# Patient Record
Sex: FEMALE | Race: BLACK OR AFRICAN AMERICAN | URBAN - METROPOLITAN AREA
[De-identification: names, ages, dates, MRNs, and addresses within clinical notes are randomized per-mention and may not be internally consistent; named-entity substitution may affect disease eponyms.]

---

## 2018-09-18 ENCOUNTER — HOSPITAL ENCOUNTER (OUTPATIENT)
Dept: OTHER | Facility: HOSPITAL | Age: 58
Setting detail: SPECIMEN
Discharge: HOME | End: 2018-09-18
Attending: INTERNAL MEDICINE | Admitting: INTERNAL MEDICINE

## 2020-02-07 ENCOUNTER — OFFICE (OUTPATIENT)
Dept: URBAN - METROPOLITAN AREA CLINIC 75 | Facility: CLINIC | Age: 60
End: 2020-02-07
Payer: COMMERCIAL

## 2020-02-07 VITALS
WEIGHT: 211 LBS | DIASTOLIC BLOOD PRESSURE: 90 MMHG | HEIGHT: 65 IN | HEART RATE: 82 BPM | SYSTOLIC BLOOD PRESSURE: 150 MMHG

## 2020-02-07 DIAGNOSIS — K74.69 OTHER CIRRHOSIS OF LIVER: ICD-10-CM

## 2020-02-07 DIAGNOSIS — R74.8 ABNORMAL LEVELS OF OTHER SERUM ENZYMES: ICD-10-CM

## 2020-02-07 DIAGNOSIS — K75.4 AUTOIMMUNE HEPATITIS: ICD-10-CM

## 2020-02-07 DIAGNOSIS — K21.9 GASTRO-ESOPHAGEAL REFLUX DISEASE WITHOUT ESOPHAGITIS: ICD-10-CM

## 2020-02-07 PROCEDURE — 99203 OFFICE O/P NEW LOW 30 MIN: CPT | Performed by: INTERNAL MEDICINE

## 2020-02-07 RX ORDER — OMEPRAZOLE 40 MG/1
40 CAPSULE, DELAYED RELEASE ORAL
Qty: 90 | Refills: 3 | Status: ACTIVE
Start: 2020-02-07

## 2020-02-13 VITALS
OXYGEN SATURATION: 95 % | SYSTOLIC BLOOD PRESSURE: 170 MMHG | RESPIRATION RATE: 25 BRPM | HEIGHT: 65 IN | RESPIRATION RATE: 13 BRPM | RESPIRATION RATE: 16 BRPM | TEMPERATURE: 97.7 F | OXYGEN SATURATION: 93 % | DIASTOLIC BLOOD PRESSURE: 77 MMHG | SYSTOLIC BLOOD PRESSURE: 122 MMHG | RESPIRATION RATE: 14 BRPM | SYSTOLIC BLOOD PRESSURE: 160 MMHG | DIASTOLIC BLOOD PRESSURE: 90 MMHG | DIASTOLIC BLOOD PRESSURE: 68 MMHG | SYSTOLIC BLOOD PRESSURE: 144 MMHG | DIASTOLIC BLOOD PRESSURE: 98 MMHG | HEART RATE: 93 BPM | SYSTOLIC BLOOD PRESSURE: 118 MMHG | HEART RATE: 86 BPM | OXYGEN SATURATION: 97 % | SYSTOLIC BLOOD PRESSURE: 171 MMHG | HEART RATE: 89 BPM | TEMPERATURE: 97.5 F | HEART RATE: 88 BPM | WEIGHT: 211 LBS | DIASTOLIC BLOOD PRESSURE: 94 MMHG | OXYGEN SATURATION: 96 % | OXYGEN SATURATION: 100 % | HEART RATE: 95 BPM

## 2020-02-14 ENCOUNTER — OFFICE (OUTPATIENT)
Dept: URBAN - METROPOLITAN AREA PATHOLOGY 4 | Facility: PATHOLOGY | Age: 60
End: 2020-02-14
Payer: COMMERCIAL

## 2020-02-14 ENCOUNTER — AMBULATORY SURGICAL CENTER (OUTPATIENT)
Dept: URBAN - METROPOLITAN AREA SURGERY 17 | Facility: SURGERY | Age: 60
End: 2020-02-14
Payer: COMMERCIAL

## 2020-02-14 DIAGNOSIS — K29.80 DUODENITIS WITHOUT BLEEDING: ICD-10-CM

## 2020-02-14 DIAGNOSIS — K21.0 GASTRO-ESOPHAGEAL REFLUX DISEASE WITH ESOPHAGITIS: ICD-10-CM

## 2020-02-14 DIAGNOSIS — I85.00 ESOPHAGEAL VARICES WITHOUT BLEEDING: ICD-10-CM

## 2020-02-14 DIAGNOSIS — K31.89 OTHER DISEASES OF STOMACH AND DUODENUM: ICD-10-CM

## 2020-02-14 DIAGNOSIS — K74.69 OTHER CIRRHOSIS OF LIVER: ICD-10-CM

## 2020-02-14 DIAGNOSIS — K29.70 GASTRITIS, UNSPECIFIED, WITHOUT BLEEDING: ICD-10-CM

## 2020-02-14 PROBLEM — K20.9 ESOPHAGITIS, UNSPECIFIED: Status: ACTIVE | Noted: 2020-02-14

## 2020-02-14 LAB
GI HISTOLOGY: A. SELECT: (no result)
GI HISTOLOGY: PDF REPORT: (no result)

## 2020-02-14 PROCEDURE — 43239 EGD BIOPSY SINGLE/MULTIPLE: CPT | Performed by: INTERNAL MEDICINE

## 2020-02-14 PROCEDURE — 88305 TISSUE EXAM BY PATHOLOGIST: CPT | Performed by: INTERNAL MEDICINE

## 2020-02-14 RX ORDER — PROPRANOLOL HYDROCHLORIDE 20 MG/1
TABLET ORAL
Qty: 0 | Refills: 0 | Status: ACTIVE

## 2020-04-30 VITALS — HEIGHT: 65 IN | WEIGHT: 213 LBS

## 2020-05-01 ENCOUNTER — OFFICE (OUTPATIENT)
Dept: URBAN - METROPOLITAN AREA CLINIC 75 | Facility: CLINIC | Age: 60
End: 2020-05-01
Payer: COMMERCIAL

## 2020-05-01 DIAGNOSIS — K75.4 AUTOIMMUNE HEPATITIS: ICD-10-CM

## 2020-05-01 DIAGNOSIS — K74.69 OTHER CIRRHOSIS OF LIVER: ICD-10-CM

## 2020-05-01 DIAGNOSIS — K25.9 GASTRIC ULCER, UNSPECIFIED AS ACUTE OR CHRONIC, WITHOUT HEMO: ICD-10-CM

## 2020-05-01 DIAGNOSIS — K21.9 GASTRO-ESOPHAGEAL REFLUX DISEASE WITHOUT ESOPHAGITIS: ICD-10-CM

## 2020-05-01 DIAGNOSIS — R77.2 ABNORMALITY OF ALPHAFETOPROTEIN: ICD-10-CM

## 2020-05-01 PROCEDURE — 99213 OFFICE O/P EST LOW 20 MIN: CPT | Mod: 95 | Performed by: INTERNAL MEDICINE

## 2020-05-01 RX ORDER — AZATHIOPRINE 50 MG/1
25 TABLET ORAL
Qty: 45 | Refills: 3 | Status: ACTIVE
Start: 2020-03-06

## 2020-05-19 VITALS
SYSTOLIC BLOOD PRESSURE: 131 MMHG | SYSTOLIC BLOOD PRESSURE: 170 MMHG | OXYGEN SATURATION: 98 % | HEART RATE: 85 BPM | DIASTOLIC BLOOD PRESSURE: 94 MMHG | HEART RATE: 77 BPM | DIASTOLIC BLOOD PRESSURE: 85 MMHG | SYSTOLIC BLOOD PRESSURE: 118 MMHG | TEMPERATURE: 96.9 F | RESPIRATION RATE: 16 BRPM | DIASTOLIC BLOOD PRESSURE: 87 MMHG | HEIGHT: 65 IN | OXYGEN SATURATION: 100 % | HEART RATE: 79 BPM | RESPIRATION RATE: 11 BRPM | SYSTOLIC BLOOD PRESSURE: 171 MMHG | SYSTOLIC BLOOD PRESSURE: 155 MMHG | RESPIRATION RATE: 20 BRPM | DIASTOLIC BLOOD PRESSURE: 78 MMHG | WEIGHT: 213 LBS | HEART RATE: 83 BPM | TEMPERATURE: 96.5 F | HEART RATE: 73 BPM | HEART RATE: 72 BPM | DIASTOLIC BLOOD PRESSURE: 96 MMHG

## 2020-05-20 ENCOUNTER — AMBULATORY SURGICAL CENTER (OUTPATIENT)
Dept: URBAN - METROPOLITAN AREA SURGERY 17 | Facility: SURGERY | Age: 60
End: 2020-05-20
Payer: COMMERCIAL

## 2020-05-20 DIAGNOSIS — K25.9 GASTRIC ULCER, UNSPECIFIED AS ACUTE OR CHRONIC, WITHOUT HEMO: ICD-10-CM

## 2020-05-20 DIAGNOSIS — K21.9 GASTRO-ESOPHAGEAL REFLUX DISEASE WITHOUT ESOPHAGITIS: ICD-10-CM

## 2020-05-20 DIAGNOSIS — I85.00 ESOPHAGEAL VARICES WITHOUT BLEEDING: ICD-10-CM

## 2020-05-20 DIAGNOSIS — K75.4 AUTOIMMUNE HEPATITIS: ICD-10-CM

## 2020-05-20 PROCEDURE — 43235 EGD DIAGNOSTIC BRUSH WASH: CPT | Performed by: INTERNAL MEDICINE

## 2020-06-15 ENCOUNTER — OFFICE (OUTPATIENT)
Dept: URBAN - METROPOLITAN AREA CLINIC 75 | Facility: CLINIC | Age: 60
End: 2020-06-15
Payer: COMMERCIAL

## 2020-06-15 VITALS — WEIGHT: 223 LBS | HEIGHT: 65 IN

## 2020-06-15 DIAGNOSIS — K21.9 GASTRO-ESOPHAGEAL REFLUX DISEASE WITHOUT ESOPHAGITIS: ICD-10-CM

## 2020-06-15 DIAGNOSIS — R77.2 ABNORMALITY OF ALPHAFETOPROTEIN: ICD-10-CM

## 2020-06-15 DIAGNOSIS — K75.4 AUTOIMMUNE HEPATITIS: ICD-10-CM

## 2020-06-15 DIAGNOSIS — K74.69 OTHER CIRRHOSIS OF LIVER: ICD-10-CM

## 2020-06-15 PROCEDURE — 99213 OFFICE O/P EST LOW 20 MIN: CPT | Mod: 95 | Performed by: INTERNAL MEDICINE

## 2020-08-12 VITALS — HEIGHT: 65 IN | WEIGHT: 245 LBS

## 2020-08-13 ENCOUNTER — TELEHEALTH PROVIDED OTHER THAN IN PATIENT'S HOME (OUTPATIENT)
Dept: URBAN - METROPOLITAN AREA TELEHEALTH 6 | Facility: TELEHEALTH | Age: 60
End: 2020-08-13
Payer: COMMERCIAL

## 2020-08-13 DIAGNOSIS — R06.02 SHORTNESS OF BREATH: ICD-10-CM

## 2020-08-13 DIAGNOSIS — R77.2 ABNORMALITY OF ALPHAFETOPROTEIN: ICD-10-CM

## 2020-08-13 DIAGNOSIS — K74.69 OTHER CIRRHOSIS OF LIVER: ICD-10-CM

## 2020-08-13 DIAGNOSIS — K75.4 AUTOIMMUNE HEPATITIS: ICD-10-CM

## 2020-08-13 PROCEDURE — G2012 BRIEF CHECK IN BY MD/QHP: HCPCS | Mod: 95 | Performed by: INTERNAL MEDICINE

## 2020-08-13 PROCEDURE — 99214 OFFICE O/P EST MOD 30 MIN: CPT | Performed by: INTERNAL MEDICINE

## 2020-08-13 NOTE — SERVICENOTES
Unable to do telemedicine visit due to patient's cell phone being broken. Virtual visit done instead with audio only. This was done due to coronavirus pandemic situation. Call lasted 6 minutes.

## 2020-08-13 NOTE — SERVICEHPINOTES
Patient is a 60 yo F with h/o gastritis, HTN, HLD, GERD, AIH, hypothyroidism here for telemedicine visit for autoimmune hepatitis. 2 identifiers were used to identify the patient.She was diagnosed with this last year by Dr. Mayberry. She had stopped taking her prednisone and azathioprine for it on her own. She had not previously been told of having cirrhosis. She had also complained of acid reflux.She had EGD 2/2020 that showed LA-A esophagitis, small EV, erosive gastritis and ulcer and duodenitis. She was restarted on omeprazole and started on propranolol as well. For her AIH she was started on azathioprine and prednisone taper. She had elevated AFP. MRI showed no concerning lesions. Repeat EGD done 5/20/20, which showed small EV. The esophagitis, ulcers had healed. She reports shortness of breath. This is worse with exertion. Resting helps. This has been gradually getting worse. Bending over causes stomach pain. Her abdomen is more distended than usual. She has also gained weight in short period of time. She reports fatigue. She does report forgetfulness. She is having regular BMs. She is on azathioprine 25mg daily, She is on prednisone 10mg daily. She is taking omeprazole. She is taking propranolol BID. She had colonoscopy in 2018. She does not have any family h/o colon ca/polyps. She [facility] liver [facility]. Per chart reviewBR4/30/2018 - LAB - acute hepatitis panel negBR12/5/2018 - CN - GINGER - good prep, diverticulosis, int hemorrhoidsBR9/5/2019 - LAB - T4 0.5L, TSH 7.23KHD040/2/2019- , , DEBORAH ujzrvsswLI38/30/2019 - GI OV - GINGER - AIH, ?overlap. on Imuran 50mg, prednisone taper to 10mg daily. AFP? BR1/13/2020 - LAB - WBC 5.3, hgb 11.7, .6H, plat 163BR1/17/2020 - PCP OV - last saw Mayberry 10/2019.BR1/17/2020 - LAB - creat 0.61, TB 0.8, , AST 59, ALT 33BR1/21/2020 - XR ABD - small right pelvic calcification- indet. no bowel obstructionBR2/3/2020 - CT ABD PELVIS w/o - mod distended GB, lobulated/nodular liver, small HH, wall thickening distal esophagus. mild diverticulosis. mild diffuse mesenteric edema with traces ascites. umbilical hernia. evidence of portal HTN. BR2/10/2020- creat 0.7, TB 0.6, , AST 91, ALT 55, WBC 4.5, hgb 12.5, plat 140, INR 1.2, TPMT hetero- intermediate activity. IgG 2612, IgA elevated, normal IgM, , DBEORAH 1:640, AMA 46, AFP 15.1, ferritin 459. MELD 8BR2/14/2020 EGD- 2 cords small varices, LA-A esophagitis, erosive gastritis, gastric ulcer, duodenitis BRPathology Report - reactive gastropathy BR2/27/2019 - Core Biopsy of Liver - chronic hepatitis, lobular inflammation grade 3-4, stage 3-4 fibrosis. differential includes AIH, drug-toxin induced hepatitis. If drug toxin cause ruled out, then c/w AIHBR3/16/2020 - Test Results - MRI abd - mildly cirrhotic liver. enlarged 14.4cm. mild regions of geographic fatty liver. mild diffuse hepatic fibrosis. normal GB, spleen. few small gastroesophageal and LUQ varices.BR3/24/2020 - CT ABD PELVIS w/o - nodular liver, trace pericardial effusion. normal spleen, pancreas. normal GB. umbilical hernia containing small bowel, SB dilated, air fluid levels, diverticulosis, uterine fibroids BR3/25/2020 - LAB - WBC 7.87, hgb 12.8, plat 127, creat 0.9, TB 1.7, AST 79, ALT 78,  BR3/27/2020 - DC SUMMARY - umbilical hernia with SB loop causing obstruction. s/p resection, d/c home on 5 days of augmentinBR5/20/2020 EGD- small EV    BR6/20/2020 - US ABDOMEN RUQ - no liver masses.

## 2020-09-14 VITALS — HEIGHT: 65 IN | WEIGHT: 245 LBS

## 2020-09-15 ENCOUNTER — OFFICE (OUTPATIENT)
Dept: URBAN - METROPOLITAN AREA CLINIC 75 | Facility: CLINIC | Age: 60
End: 2020-09-15
Payer: COMMERCIAL

## 2020-09-15 DIAGNOSIS — R77.2 ABNORMALITY OF ALPHAFETOPROTEIN: ICD-10-CM

## 2020-09-15 DIAGNOSIS — K75.4 AUTOIMMUNE HEPATITIS: ICD-10-CM

## 2020-09-15 DIAGNOSIS — K74.69 OTHER CIRRHOSIS OF LIVER: ICD-10-CM

## 2020-09-15 DIAGNOSIS — I85.00 ESOPHAGEAL VARICES WITHOUT BLEEDING: ICD-10-CM

## 2020-09-15 PROCEDURE — 99214 OFFICE O/P EST MOD 30 MIN: CPT | Performed by: INTERNAL MEDICINE

## 2020-09-15 RX ORDER — LACTULOSE 10 G/15ML
100 SOLUTION ORAL
Qty: 2700 | Refills: 6 | Status: COMPLETED
Start: 2020-09-15 | End: 2021-01-21

## 2021-01-21 ENCOUNTER — OFFICE (OUTPATIENT)
Dept: URBAN - METROPOLITAN AREA CLINIC 75 | Facility: CLINIC | Age: 61
End: 2021-01-21

## 2021-01-21 VITALS
SYSTOLIC BLOOD PRESSURE: 129 MMHG | HEART RATE: 88 BPM | WEIGHT: 259 LBS | DIASTOLIC BLOOD PRESSURE: 78 MMHG | TEMPERATURE: 96 F | OXYGEN SATURATION: 98 % | HEIGHT: 65 IN

## 2021-01-21 DIAGNOSIS — K75.4 AUTOIMMUNE HEPATITIS: ICD-10-CM

## 2021-01-21 DIAGNOSIS — I85.00 ESOPHAGEAL VARICES WITHOUT BLEEDING: ICD-10-CM

## 2021-01-21 DIAGNOSIS — K74.69 OTHER CIRRHOSIS OF LIVER: ICD-10-CM

## 2021-01-21 PROCEDURE — 99214 OFFICE O/P EST MOD 30 MIN: CPT | Performed by: INTERNAL MEDICINE

## 2022-06-22 ENCOUNTER — CLAIMS CREATED FROM THE CLAIM WINDOW (OUTPATIENT)
Dept: URBAN - METROPOLITAN AREA CLINIC 82 | Facility: CLINIC | Age: 62
End: 2022-06-22
Payer: MEDICARE

## 2022-06-22 ENCOUNTER — LAB OUTSIDE AN ENCOUNTER (OUTPATIENT)
Dept: URBAN - METROPOLITAN AREA CLINIC 82 | Facility: CLINIC | Age: 62
End: 2022-06-22

## 2022-06-22 VITALS
WEIGHT: 212.8 LBS | DIASTOLIC BLOOD PRESSURE: 88 MMHG | HEIGHT: 64 IN | TEMPERATURE: 98.5 F | RESPIRATION RATE: 16 BRPM | BODY MASS INDEX: 36.33 KG/M2 | HEART RATE: 71 BPM | SYSTOLIC BLOOD PRESSURE: 143 MMHG

## 2022-06-22 DIAGNOSIS — R10.13 DYSPEPSIA: ICD-10-CM

## 2022-06-22 DIAGNOSIS — K75.4 AUTOIMMUNE HEPATITIS: ICD-10-CM

## 2022-06-22 PROCEDURE — 99204 OFFICE O/P NEW MOD 45 MIN: CPT | Performed by: INTERNAL MEDICINE

## 2022-06-22 PROCEDURE — 1036F TOBACCO NON-USER: CPT | Performed by: INTERNAL MEDICINE

## 2022-06-22 PROCEDURE — G8427 DOCREV CUR MEDS BY ELIG CLIN: HCPCS | Performed by: INTERNAL MEDICINE

## 2022-06-22 PROCEDURE — G9903 PT SCRN TBCO ID AS NON USER: HCPCS | Performed by: INTERNAL MEDICINE

## 2022-06-22 PROCEDURE — G8419 CALC BMI OUT NRM PARAM NOF/U: HCPCS | Performed by: INTERNAL MEDICINE

## 2022-06-22 RX ORDER — PANTOPRAZOLE SODIUM 40 MG/1
1 TABLET TABLET, DELAYED RELEASE ORAL ONCE A DAY
Qty: 90 | Refills: 1 | OUTPATIENT
Start: 2022-06-22

## 2022-06-22 RX ORDER — METOCLOPRAMIDE HYDROCHLORIDE 10 MG/1
1 TABLET BEFORE MEALS TABLET ORAL THREE TIMES A DAY
Qty: 45 TABLET | Refills: 1 | OUTPATIENT
Start: 2022-06-22

## 2022-06-22 NOTE — HPI-TODAY'S VISIT:
06/22/2022 Patient is a 61 year old  female with concerns of her liver and stomach and with pain, Patient states that she was diagnosed with autoimmune hepatitis 4 years ago. She states that she was diagnosed in [place] and states that she has taken Prednisone in the past. Patient states that she does not think her food is digesting properly. Patient also states that she has diabetes but that it is under control. Patient also states that she has thyroid problems and states that she has pain on the right side of her stomach. She states that she is not able to lay on her back and states that she gets acid reflux every morning. Patient states that she has had an endoscope done years ago. She also states that she feels tenderness in in the middle of her stomach

## 2022-06-24 ENCOUNTER — OFFICE VISIT (OUTPATIENT)
Dept: URBAN - METROPOLITAN AREA CLINIC 81 | Facility: CLINIC | Age: 62
End: 2022-06-24

## 2022-07-22 ENCOUNTER — OFFICE VISIT (OUTPATIENT)
Dept: URBAN - METROPOLITAN AREA CLINIC 81 | Facility: CLINIC | Age: 62
End: 2022-07-22

## 2022-08-19 ENCOUNTER — OFFICE VISIT (OUTPATIENT)
Dept: URBAN - METROPOLITAN AREA CLINIC 81 | Facility: CLINIC | Age: 62
End: 2022-08-19
Payer: MEDICARE

## 2022-08-19 DIAGNOSIS — K75.4 AUTOIMMUNE HEPATITIS: ICD-10-CM

## 2022-08-19 DIAGNOSIS — K74.69 CIRRHOSIS, CRYPTOGENIC: ICD-10-CM

## 2022-08-19 PROCEDURE — 76705 ECHO EXAM OF ABDOMEN: CPT | Performed by: INTERNAL MEDICINE

## 2022-08-31 ENCOUNTER — TELEPHONE ENCOUNTER (OUTPATIENT)
Dept: URBAN - METROPOLITAN AREA CLINIC 82 | Facility: CLINIC | Age: 62
End: 2022-08-31

## 2022-09-02 LAB
A/G RATIO: 0.9
ABSOLUTE BASOPHILS: 21
ABSOLUTE EOSINOPHILS: 172
ABSOLUTE LYMPHOCYTES: 1711
ABSOLUTE MONOCYTES: 515
ABSOLUTE NEUTROPHILS: 2782
AFP, SERUM, TUMOR MARKER: 7.2
ALBUMIN: 3.9
ALKALINE PHOSPHATASE: 100
ALT (SGPT): 51
ANA SCREEN, IFA: NEGATIVE
AST (SGOT): 63
BASOPHILS: 0.4
BILIRUBIN, TOTAL: 0.5
BUN/CREATININE RATIO: (no result)
BUN: 11
CALCIUM: 9.8
CARBON DIOXIDE, TOTAL: 22
CHLORIDE: 106
CREATININE: 0.77
EGFR: 88
EOSINOPHILS: 3.3
GLOBULIN, TOTAL: 4.4
GLUCOSE: 94
HBSAG SCREEN: (no result)
HEMATOCRIT: 37.7
HEMOGLOBIN: 13
HEP A AB, IGM: (no result)
HEP B CORE AB, IGM: (no result)
HEP C VIRUS AB: 0.03
HEPATITIS C ANTIBODY: (no result)
LYMPHOCYTES: 32.9
MCH: 31.1
MCHC: 34.5
MCV: 90.2
MITOCHONDRIAL (M2) ANTIBODY: 142
MONOCYTES: 9.9
MPV: 10.5
NEUTROPHILS: 53.5
PLATELET COUNT: 155
POTASSIUM: 3.7
PROTEIN, TOTAL: 8.3
RDW: 13.3
RED BLOOD CELL COUNT: 4.18
SODIUM: 138
WHITE BLOOD CELL COUNT: 5.2

## 2022-09-13 ENCOUNTER — WEB ENCOUNTER (OUTPATIENT)
Dept: URBAN - METROPOLITAN AREA CLINIC 82 | Facility: CLINIC | Age: 62
End: 2022-09-13

## 2022-09-20 ENCOUNTER — OFFICE VISIT (OUTPATIENT)
Dept: URBAN - METROPOLITAN AREA MEDICAL CENTER 24 | Facility: MEDICAL CENTER | Age: 62
End: 2022-09-20
Payer: MEDICARE

## 2022-09-20 DIAGNOSIS — K75.4 AUTOIMMUNE HEPATITIS: ICD-10-CM

## 2022-09-20 DIAGNOSIS — K29.60 ADENOPAPILLOMATOSIS GASTRICA: ICD-10-CM

## 2022-09-20 DIAGNOSIS — K21.00 ALKALINE REFLUX ESOPHAGITIS: ICD-10-CM

## 2022-09-20 PROCEDURE — 43239 EGD BIOPSY SINGLE/MULTIPLE: CPT | Performed by: INTERNAL MEDICINE

## 2022-10-18 ENCOUNTER — LAB OUTSIDE AN ENCOUNTER (OUTPATIENT)
Dept: URBAN - METROPOLITAN AREA CLINIC 82 | Facility: CLINIC | Age: 62
End: 2022-10-18

## 2022-10-18 ENCOUNTER — OFFICE VISIT (OUTPATIENT)
Dept: URBAN - METROPOLITAN AREA CLINIC 82 | Facility: CLINIC | Age: 62
End: 2022-10-18
Payer: MEDICARE

## 2022-10-18 VITALS
TEMPERATURE: 97.3 F | HEIGHT: 64 IN | WEIGHT: 218 LBS | HEART RATE: 94 BPM | BODY MASS INDEX: 37.22 KG/M2 | SYSTOLIC BLOOD PRESSURE: 177 MMHG | RESPIRATION RATE: 16 BRPM | DIASTOLIC BLOOD PRESSURE: 98 MMHG

## 2022-10-18 DIAGNOSIS — R93.89 ABNORMAL ULTRASOUND: ICD-10-CM

## 2022-10-18 DIAGNOSIS — K75.4 AUTOIMMUNE HEPATITIS: ICD-10-CM

## 2022-10-18 DIAGNOSIS — R10.13 DYSPEPSIA: ICD-10-CM

## 2022-10-18 DIAGNOSIS — K21.9 GASTROESOPHAGEAL REFLUX DISEASE, UNSPECIFIED WHETHER ESOPHAGITIS PRESENT: ICD-10-CM

## 2022-10-18 PROCEDURE — 99214 OFFICE O/P EST MOD 30 MIN: CPT | Performed by: INTERNAL MEDICINE

## 2022-10-18 PROCEDURE — G8427 DOCREV CUR MEDS BY ELIG CLIN: HCPCS | Performed by: INTERNAL MEDICINE

## 2022-10-18 PROCEDURE — G9903 PT SCRN TBCO ID AS NON USER: HCPCS | Performed by: INTERNAL MEDICINE

## 2022-10-18 PROCEDURE — G8419 CALC BMI OUT NRM PARAM NOF/U: HCPCS | Performed by: INTERNAL MEDICINE

## 2022-10-18 RX ORDER — PANTOPRAZOLE SODIUM 40 MG/1
1 TABLET TABLET, DELAYED RELEASE ORAL ONCE A DAY
Qty: 90 | Refills: 1 | Status: ACTIVE | COMMUNITY
Start: 2022-06-22

## 2022-10-18 RX ORDER — URSODIOL 500 MG/1
1 TABLET TABLET ORAL TWICE A DAY
Qty: 180 TABLET | Refills: 1 | OUTPATIENT
Start: 2022-10-18

## 2022-10-18 RX ORDER — METOCLOPRAMIDE HYDROCHLORIDE 10 MG/1
1 TABLET BEFORE MEALS TABLET ORAL THREE TIMES A DAY
Qty: 45 TABLET | Refills: 1 | Status: ACTIVE | COMMUNITY
Start: 2022-06-22

## 2022-10-18 NOTE — HPI-TODAY'S VISIT:
06/22/2022 Patient is a 61 year old  female with concerns of her liver and stomach and with pain, Patient states that she was diagnosed with autoimmune hepatitis 4 years ago. She states that she was diagnosed in [place] and states that she has taken Prednisone in the past. Patient states that she does not think her food is digesting properly. Patient also states that she has diabetes but that it is under control. Patient also states that she has thyroid problems and states that she has pain on the right side of her stomach. She states that she is not able to lay on her back and states that she gets acid reflux every morning. Patient states that she has had an endoscope done years ago. She also states that she feels tenderness in in the middle of her stomach.   10/18/2022 Patient presents for a follow up on EGD, labs, and ultrasound. Ultrasound is suggestive of cirrhosis of the liver. Patient denies any new symptoms in her stomach. Patient states that before she was diagnosed with cirrhosis of the liver with ascites. Patient denies taking any acid reflux medication. She states that she does have back pain which radiates to her right side.

## 2022-12-06 ENCOUNTER — LAB OUTSIDE AN ENCOUNTER (OUTPATIENT)
Dept: URBAN - METROPOLITAN AREA CLINIC 82 | Facility: CLINIC | Age: 62
End: 2022-12-06

## 2022-12-06 ENCOUNTER — OFFICE VISIT (OUTPATIENT)
Dept: URBAN - METROPOLITAN AREA CLINIC 82 | Facility: CLINIC | Age: 62
End: 2022-12-06
Payer: MEDICARE

## 2022-12-06 VITALS
WEIGHT: 219.6 LBS | HEART RATE: 97 BPM | HEIGHT: 64 IN | BODY MASS INDEX: 37.49 KG/M2 | DIASTOLIC BLOOD PRESSURE: 104 MMHG | SYSTOLIC BLOOD PRESSURE: 177 MMHG | TEMPERATURE: 97.1 F

## 2022-12-06 DIAGNOSIS — K75.4 AUTOIMMUNE HEPATITIS: ICD-10-CM

## 2022-12-06 DIAGNOSIS — K74.3 PRIMARY BILIARY CIRRHOSIS: ICD-10-CM

## 2022-12-06 DIAGNOSIS — K74.69 OTHER CIRRHOSIS OF LIVER: ICD-10-CM

## 2022-12-06 DIAGNOSIS — R93.89 ABNORMAL ULTRASOUND: ICD-10-CM

## 2022-12-06 DIAGNOSIS — K21.9 GASTROESOPHAGEAL REFLUX DISEASE, UNSPECIFIED WHETHER ESOPHAGITIS PRESENT: ICD-10-CM

## 2022-12-06 DIAGNOSIS — R10.13 DYSPEPSIA: ICD-10-CM

## 2022-12-06 PROBLEM — 19943007: Status: ACTIVE | Noted: 2022-12-06

## 2022-12-06 PROCEDURE — G9903 PT SCRN TBCO ID AS NON USER: HCPCS | Performed by: INTERNAL MEDICINE

## 2022-12-06 PROCEDURE — 99214 OFFICE O/P EST MOD 30 MIN: CPT | Performed by: INTERNAL MEDICINE

## 2022-12-06 PROCEDURE — G8417 CALC BMI ABV UP PARAM F/U: HCPCS | Performed by: INTERNAL MEDICINE

## 2022-12-06 PROCEDURE — G8427 DOCREV CUR MEDS BY ELIG CLIN: HCPCS | Performed by: INTERNAL MEDICINE

## 2022-12-06 RX ORDER — PANTOPRAZOLE SODIUM 40 MG/1
1 TABLET TABLET, DELAYED RELEASE ORAL ONCE A DAY
Qty: 90 | Refills: 1 | OUTPATIENT
Start: 2022-12-06

## 2022-12-06 RX ORDER — URSODIOL 500 MG/1
1 TABLET TABLET ORAL TWICE A DAY
Qty: 180 TABLET | Refills: 1 | Status: ACTIVE | COMMUNITY
Start: 2022-10-18

## 2022-12-06 RX ORDER — METOCLOPRAMIDE HYDROCHLORIDE 10 MG/1
1 TABLET BEFORE MEALS TABLET ORAL THREE TIMES A DAY
Qty: 45 TABLET | Refills: 1 | Status: ACTIVE | COMMUNITY
Start: 2022-06-22

## 2022-12-06 RX ORDER — PANTOPRAZOLE SODIUM 40 MG/1
1 TABLET TABLET, DELAYED RELEASE ORAL ONCE A DAY
Qty: 90 | Refills: 1 | Status: ACTIVE | COMMUNITY
Start: 2022-06-22

## 2022-12-06 NOTE — HPI-TODAY'S VISIT:
06/22/2022 Patient is a 61 year old  female with concerns of her liver and stomach and with pain, Patient states that she was diagnosed with autoimmune hepatitis 4 years ago. She states that she was diagnosed in [place] and states that she has taken Prednisone in the past. Patient states that she does not think her food is digesting properly. Patient also states that she has diabetes but that it is under control. Patient also states that she has thyroid problems and states that she has pain on the right side of her stomach. She states that she is not able to lay on her back and states that she gets acid reflux every morning. Patient states that she has had an endoscope done years ago. She also states that she feels tenderness in in the middle of her stomach.   10/18/2022 Patient presents for a follow up on EGD, labs, and ultrasound. Ultrasound is suggestive of cirrhosis of the liver. Patient denies any new symptoms in her stomach. Patient states that before she was diagnosed with cirrhosis of the liver with ascites. Patient denies taking any acid reflux medication. She states that she does have back pain which radiates to her right side.  12/6/2022 Patient presents for a follow up on liver biopsy. Patient states that she has been having very bad knee pain and that she was referred to an orthopedic doctor. She states that she is still on ursodiol.

## 2022-12-07 LAB
A/G RATIO: 0.8
ABSOLUTE BASOPHILS: 37
ABSOLUTE EOSINOPHILS: 167
ABSOLUTE LYMPHOCYTES: 2486
ABSOLUTE MONOCYTES: 577
ABSOLUTE NEUTROPHILS: 2933
ALBUMIN: 3.7
ALKALINE PHOSPHATASE: 90
ALT (SGPT): 65
AST (SGOT): 71
BASOPHILS: 0.6
BILIRUBIN, TOTAL: 0.5
BUN/CREATININE RATIO: (no result)
BUN: 14
CALCIUM: 9.8
CARBON DIOXIDE, TOTAL: 25
CHLORIDE: 107
CREATININE: 0.76
EGFR: 89
EOSINOPHILS: 2.7
GLOBULIN, TOTAL: 4.8
GLUCOSE: 88
HEMATOCRIT: 37.6
HEMOGLOBIN: 12.8
INR: 1.1
LYMPHOCYTES: 40.1
MCH: 31.3
MCHC: 34
MCV: 91.9
MONOCYTES: 9.3
MPV: 11.8
NEUTROPHILS: 47.3
PLATELET COUNT: 176
POTASSIUM: 3.9
PROTEIN, TOTAL: 8.5
PT: 11.3
RDW: 13
RED BLOOD CELL COUNT: 4.09
SODIUM: 139
WHITE BLOOD CELL COUNT: 6.2

## 2023-02-17 ENCOUNTER — TELEPHONE ENCOUNTER (OUTPATIENT)
Dept: URBAN - METROPOLITAN AREA CLINIC 82 | Facility: CLINIC | Age: 63
End: 2023-02-17

## 2023-03-03 ENCOUNTER — OFFICE VISIT (OUTPATIENT)
Dept: URBAN - METROPOLITAN AREA CLINIC 82 | Facility: CLINIC | Age: 63
End: 2023-03-03

## 2023-03-08 ENCOUNTER — LAB OUTSIDE AN ENCOUNTER (OUTPATIENT)
Dept: URBAN - METROPOLITAN AREA CLINIC 82 | Facility: CLINIC | Age: 63
End: 2023-03-08

## 2023-03-08 ENCOUNTER — OFFICE VISIT (OUTPATIENT)
Dept: URBAN - METROPOLITAN AREA CLINIC 82 | Facility: CLINIC | Age: 63
End: 2023-03-08
Payer: MEDICARE

## 2023-03-08 VITALS
HEART RATE: 90 BPM | TEMPERATURE: 97.8 F | RESPIRATION RATE: 16 BRPM | SYSTOLIC BLOOD PRESSURE: 167 MMHG | DIASTOLIC BLOOD PRESSURE: 90 MMHG | BODY MASS INDEX: 39.76 KG/M2 | WEIGHT: 224.4 LBS | HEIGHT: 63 IN

## 2023-03-08 DIAGNOSIS — K21.9 GASTROESOPHAGEAL REFLUX DISEASE, UNSPECIFIED WHETHER ESOPHAGITIS PRESENT: ICD-10-CM

## 2023-03-08 DIAGNOSIS — K74.3 PRIMARY BILIARY CIRRHOSIS: ICD-10-CM

## 2023-03-08 DIAGNOSIS — R93.89 ABNORMAL ULTRASOUND: ICD-10-CM

## 2023-03-08 DIAGNOSIS — R10.13 DYSPEPSIA: ICD-10-CM

## 2023-03-08 DIAGNOSIS — K75.4 AUTOIMMUNE HEPATITIS: ICD-10-CM

## 2023-03-08 DIAGNOSIS — K74.69 OTHER CIRRHOSIS OF LIVER: ICD-10-CM

## 2023-03-08 DIAGNOSIS — R10.32 LEFT LOWER QUADRANT ABDOMINAL PAIN: ICD-10-CM

## 2023-03-08 PROCEDURE — G8427 DOCREV CUR MEDS BY ELIG CLIN: HCPCS | Performed by: INTERNAL MEDICINE

## 2023-03-08 PROCEDURE — G8417 CALC BMI ABV UP PARAM F/U: HCPCS | Performed by: INTERNAL MEDICINE

## 2023-03-08 PROCEDURE — G9903 PT SCRN TBCO ID AS NON USER: HCPCS | Performed by: INTERNAL MEDICINE

## 2023-03-08 PROCEDURE — 99214 OFFICE O/P EST MOD 30 MIN: CPT | Performed by: INTERNAL MEDICINE

## 2023-03-08 RX ORDER — METOCLOPRAMIDE HYDROCHLORIDE 10 MG/1
1 TABLET BEFORE MEALS TABLET ORAL THREE TIMES A DAY
Qty: 45 TABLET | Refills: 1 | Status: ACTIVE | COMMUNITY
Start: 2022-06-22

## 2023-03-08 RX ORDER — URSODIOL 500 MG/1
1 TABLET TABLET ORAL TWICE A DAY
Qty: 180 TABLET | Refills: 1 | Status: ACTIVE | COMMUNITY
Start: 2022-10-18

## 2023-03-08 RX ORDER — DICYCLOMINE HYDROCHLORIDE 10 MG/1
1 TABLET CAPSULE ORAL THREE TIMES A DAY
Qty: 90 | Refills: 1 | OUTPATIENT
Start: 2023-03-08 | End: 2023-05-07

## 2023-03-08 RX ORDER — PANTOPRAZOLE SODIUM 40 MG/1
1 TABLET TABLET, DELAYED RELEASE ORAL ONCE A DAY
Qty: 90 | Refills: 1 | Status: ACTIVE | COMMUNITY
Start: 2022-12-06

## 2023-03-08 RX ORDER — PANTOPRAZOLE SODIUM 40 MG/1
1 TABLET TABLET, DELAYED RELEASE ORAL ONCE A DAY
Qty: 90 | Refills: 1 | Status: ACTIVE | COMMUNITY
Start: 2022-06-22

## 2023-03-08 NOTE — HPI-TODAY'S VISIT:
06/22/2022 Patient is a 61 year old  female with concerns of her liver and stomach and with pain, Patient states that she was diagnosed with autoimmune hepatitis 4 years ago. She states that she was diagnosed in [place] and states that she has taken Prednisone in the past. Patient states that she does not think her food is digesting properly. Patient also states that she has diabetes but that it is under control. Patient also states that she has thyroid problems and states that she has pain on the right side of her stomach. She states that she is not able to lay on her back and states that she gets acid reflux every morning. Patient states that she has had an endoscope done years ago. She also states that she feels tenderness in in the middle of her stomach.   10/18/2022 Patient presents for a follow up on EGD, labs, and ultrasound. Ultrasound is suggestive of cirrhosis of the liver. Patient denies any new symptoms in her stomach. Patient states that before she was diagnosed with cirrhosis of the liver with ascites. Patient denies taking any acid reflux medication. She states that she does have back pain which radiates to her right side.  12/6/2022 Patient presents for a follow up on liver biopsy. Patient states that she has been having very bad knee pain and that she was referred to an orthopedic doctor. She states that she is still on ursodiol.  3/8/2023 Patient presents for a follow up. Patient states that now she now has to see an orthopedic doctor due to a nerve pinch in her knee. She is still on ursodiol and she states that she is currently on prednisone. Patient denies body itching or jaundice. She denies leg swelling. Patient states that she is on pantoprazole for acid reflux. She states that sometimes she has tenderness in her left lower quadrant. She states that she was unable to tolerate azathioprine.

## 2023-03-14 ENCOUNTER — TELEPHONE ENCOUNTER (OUTPATIENT)
Dept: URBAN - METROPOLITAN AREA CLINIC 82 | Facility: CLINIC | Age: 63
End: 2023-03-14

## 2023-04-03 ENCOUNTER — LAB OUTSIDE AN ENCOUNTER (OUTPATIENT)
Dept: URBAN - METROPOLITAN AREA CLINIC 82 | Facility: CLINIC | Age: 63
End: 2023-04-03

## 2023-04-09 NOTE — PHYSICAL EXAM LYMPHATIC:
Goal Outcome Evaluation:  Plan of Care Reviewed With: patient, spouse        Progress: improving  Outcome Evaluation: Physical therapy treatment complete. The patient with significant improvement in mobility and ambulation distance. Patient required SBA to ambulate with SPC, no rest breaks required and no gait deficits noted. Patient without complaints of dyspnea during ambulation, oxygen saturation stable on room air. At this time recommend patient return home with assist and OPPT.   Neck , no lymphadenopathy

## 2023-11-14 ENCOUNTER — P2P PATIENT RECORD (OUTPATIENT)
Age: 63
End: 2023-11-14

## 2024-01-29 ENCOUNTER — LAB OUTSIDE AN ENCOUNTER (OUTPATIENT)
Dept: URBAN - METROPOLITAN AREA CLINIC 82 | Facility: CLINIC | Age: 64
End: 2024-01-29

## 2024-01-29 ENCOUNTER — OFFICE VISIT (OUTPATIENT)
Dept: URBAN - METROPOLITAN AREA CLINIC 82 | Facility: CLINIC | Age: 64
End: 2024-01-29
Payer: MEDICARE

## 2024-01-29 ENCOUNTER — DASHBOARD ENCOUNTERS (OUTPATIENT)
Age: 64
End: 2024-01-29

## 2024-01-29 VITALS
BODY MASS INDEX: 38.09 KG/M2 | TEMPERATURE: 98.1 F | HEART RATE: 63 BPM | SYSTOLIC BLOOD PRESSURE: 123 MMHG | WEIGHT: 215 LBS | DIASTOLIC BLOOD PRESSURE: 92 MMHG | HEIGHT: 63 IN

## 2024-01-29 DIAGNOSIS — R10.32 LEFT LOWER QUADRANT ABDOMINAL PAIN: ICD-10-CM

## 2024-01-29 DIAGNOSIS — K74.69 OTHER CIRRHOSIS OF LIVER: ICD-10-CM

## 2024-01-29 DIAGNOSIS — R10.13 DYSPEPSIA: ICD-10-CM

## 2024-01-29 DIAGNOSIS — K75.4 AUTOIMMUNE HEPATITIS: ICD-10-CM

## 2024-01-29 DIAGNOSIS — Z12.11 SCREENING FOR COLON CANCER: ICD-10-CM

## 2024-01-29 DIAGNOSIS — K21.9 GASTROESOPHAGEAL REFLUX DISEASE, UNSPECIFIED WHETHER ESOPHAGITIS PRESENT: ICD-10-CM

## 2024-01-29 DIAGNOSIS — R93.89 ABNORMAL ULTRASOUND: ICD-10-CM

## 2024-01-29 DIAGNOSIS — K74.3 PRIMARY BILIARY CIRRHOSIS: ICD-10-CM

## 2024-01-29 PROBLEM — 408335007: Status: ACTIVE | Noted: 2022-06-22

## 2024-01-29 PROBLEM — 169255008: Status: ACTIVE | Noted: 2022-10-18

## 2024-01-29 PROBLEM — 235595009: Status: ACTIVE | Noted: 2022-10-18

## 2024-01-29 PROBLEM — 31712002: Status: ACTIVE | Noted: 2022-12-06

## 2024-01-29 PROBLEM — 19943007: Status: ACTIVE | Noted: 2022-12-06

## 2024-01-29 PROCEDURE — 99214 OFFICE O/P EST MOD 30 MIN: CPT | Performed by: INTERNAL MEDICINE

## 2024-01-29 RX ORDER — POLYETHYLENE GLYCOL-3350 AND ELECTROLYTES WITH FLAVOR PACK 240; 5.84; 2.98; 6.72; 22.72 G/278.26G; G/278.26G; G/278.26G; G/278.26G; G/278.26G
AS DIRECTED POWDER, FOR SOLUTION ORAL
Qty: 1 | Refills: 0 | OUTPATIENT
Start: 2024-01-29 | End: 2024-01-30

## 2024-01-29 RX ORDER — PANTOPRAZOLE SODIUM 40 MG/1
1 TABLET TABLET, DELAYED RELEASE ORAL ONCE A DAY
Qty: 90 | Refills: 1 | Status: ACTIVE | COMMUNITY
Start: 2022-06-22

## 2024-01-29 RX ORDER — PANTOPRAZOLE SODIUM 40 MG/1
1 TABLET TABLET, DELAYED RELEASE ORAL ONCE A DAY
Qty: 90 | Refills: 1 | Status: ACTIVE | COMMUNITY
Start: 2022-12-06

## 2024-01-29 RX ORDER — METOCLOPRAMIDE HYDROCHLORIDE 10 MG/1
1 TABLET BEFORE MEALS TABLET ORAL THREE TIMES A DAY
Qty: 45 TABLET | Refills: 1 | Status: ON HOLD | COMMUNITY
Start: 2022-06-22

## 2024-01-29 RX ORDER — URSODIOL 500 MG/1
1 TABLET TABLET ORAL TWICE A DAY
Qty: 180 TABLET | Refills: 1 | Status: ACTIVE | COMMUNITY
Start: 2022-10-18

## 2024-01-29 NOTE — HPI-TODAY'S VISIT:
06/22/2022 Patient is a 61 year old  female with concerns of her liver and stomach and with pain, Patient states that she was diagnosed with autoimmune hepatitis 4 years ago. She states that she was diagnosed in [place] and states that she has taken Prednisone in the past. Patient states that she does not think her food is digesting properly. Patient also states that she has diabetes but that it is under control. Patient also states that she has thyroid problems and states that she has pain on the right side of her stomach. She states that she is not able to lay on her back and states that she gets acid reflux every morning. Patient states that she has had an endoscope done years ago. She also states that she feels tenderness in in the middle of her stomach.   10/18/2022 Patient presents for a follow up on EGD, labs, and ultrasound. Ultrasound is suggestive of cirrhosis of the liver. Patient denies any new symptoms in her stomach. Patient states that before she was diagnosed with cirrhosis of the liver with ascites. Patient denies taking any acid reflux medication. She states that she does have back pain which radiates to her right side.  12/6/2022 Patient presents for a follow up on liver biopsy. Patient states that she has been having very bad knee pain and that she was referred to an orthopedic doctor. She states that she is still on ursodiol.  3/8/2023 Patient presents for a follow up. Patient states that now she now has to see an orthopedic doctor due to a nerve pinch in her knee. She is still on ursodiol and she states that she is currently on prednisone. Patient denies body itching or jaundice. She denies leg swelling. Patient states that she is on pantoprazole for acid reflux. She states that sometimes she has tenderness in her left lower quadrant. She states that she was unable to tolerate azathioprine.  1/29/2024 Patient presents for a follow up. Patient states that she has been following up with Dr. Posey and that she is on ursodiol and mercaptopurine. She states that she was sent here by her PCP to get a colonoscopy done due to not having one in the past. She denies family hisotyr of polyps or colon cancer. She states that she has some acid reflux.

## 2024-03-26 ENCOUNTER — COL/EGD (OUTPATIENT)
Dept: URBAN - METROPOLITAN AREA MEDICAL CENTER 24 | Facility: MEDICAL CENTER | Age: 64
End: 2024-03-26

## 2024-06-11 ENCOUNTER — WEB ENCOUNTER (OUTPATIENT)
Dept: URBAN - METROPOLITAN AREA CLINIC 82 | Facility: CLINIC | Age: 64
End: 2024-06-11

## 2024-06-11 RX ORDER — PANTOPRAZOLE SODIUM 40 MG/1
1 TABLET TABLET, DELAYED RELEASE ORAL ONCE A DAY
Qty: 90 | Refills: 1 | OUTPATIENT
Start: 2024-06-12

## 2024-10-31 ENCOUNTER — TELEPHONE ENCOUNTER (OUTPATIENT)
Dept: URBAN - METROPOLITAN AREA CLINIC 82 | Facility: CLINIC | Age: 64
End: 2024-10-31

## 2024-10-31 RX ORDER — PANTOPRAZOLE SODIUM 40 MG/1
1 TABLET TABLET, DELAYED RELEASE ORAL ONCE A DAY
Qty: 90 TABLET | Refills: 3 | OUTPATIENT
Start: 2024-10-31

## 2025-06-18 ENCOUNTER — OFFICE VISIT (OUTPATIENT)
Dept: URBAN - METROPOLITAN AREA CLINIC 82 | Facility: CLINIC | Age: 65
End: 2025-06-18
Payer: MEDICARE

## 2025-06-18 DIAGNOSIS — K29.70 GASTRITIS DETERMINED BY ENDOSCOPY: ICD-10-CM

## 2025-06-18 DIAGNOSIS — R09.89 PHLEGM IN THROAT: ICD-10-CM

## 2025-06-18 DIAGNOSIS — K64.4 HEMORRHOIDS, EXTERNAL: ICD-10-CM

## 2025-06-18 DIAGNOSIS — R10.13 EPIGASTRIC PAIN: ICD-10-CM

## 2025-06-18 PROBLEM — 23913003: Status: ACTIVE | Noted: 2025-06-18

## 2025-06-18 PROBLEM — 79922009: Status: ACTIVE | Noted: 2025-06-18

## 2025-06-18 PROBLEM — 102617004: Status: ACTIVE | Noted: 2025-06-18

## 2025-06-18 PROCEDURE — 99214 OFFICE O/P EST MOD 30 MIN: CPT | Performed by: STUDENT IN AN ORGANIZED HEALTH CARE EDUCATION/TRAINING PROGRAM

## 2025-06-18 RX ORDER — URSODIOL 500 MG/1
1 TABLET TABLET ORAL TWICE A DAY
Qty: 180 TABLET | Refills: 1 | COMMUNITY
Start: 2024-04-19

## 2025-06-18 RX ORDER — AMOXICILLIN AND CLAVULANATE POTASSIUM 875; 125 MG/1; MG/1
1 TABLET TABLET, FILM COATED ORAL
Qty: 6 | Status: ACTIVE | COMMUNITY

## 2025-06-18 RX ORDER — FUROSEMIDE 20 MG/1
1 TABLET TABLET ORAL ONCE A DAY
Qty: 30 | Status: DISCONTINUED | COMMUNITY

## 2025-06-18 RX ORDER — URSODIOL 500 MG/1
TABLET ORAL
Qty: 270 TABLET | Status: DISCONTINUED | COMMUNITY

## 2025-06-18 RX ORDER — HYDROCHLOROTHIAZIDE 25 MG/1
1 TABLET IN THE MORNING TABLET ORAL ONCE A DAY
Qty: 30 | Status: ACTIVE | COMMUNITY

## 2025-06-18 RX ORDER — PANTOPRAZOLE SODIUM 40 MG/1
1 TABLET TABLET, DELAYED RELEASE ORAL ONCE A DAY
Qty: 90 | Refills: 1 | COMMUNITY
Start: 2022-06-22

## 2025-06-18 RX ORDER — URSODIOL 500 MG/1
1 TABLET TABLET ORAL TWICE A DAY
Qty: 180 TABLET | Refills: 1 | COMMUNITY
Start: 2022-10-18

## 2025-06-18 RX ORDER — PANTOPRAZOLE SODIUM 40 MG/1
1 TABLET TABLET, DELAYED RELEASE ORAL ONCE A DAY
Qty: 90 | Refills: 1 | COMMUNITY
Start: 2022-12-06

## 2025-06-18 RX ORDER — PANTOPRAZOLE SODIUM 40 MG/1
1 TABLET TABLET, DELAYED RELEASE ORAL ONCE A DAY
Qty: 90 TABLET | Refills: 3 | COMMUNITY
Start: 2024-10-31

## 2025-06-18 RX ORDER — URSODIOL 500 MG/1
1 TABLET TABLET ORAL TWICE A DAY
Qty: 180 TABLET | Refills: 1 | Status: ACTIVE | COMMUNITY
Start: 2024-04-19

## 2025-06-18 RX ORDER — METOCLOPRAMIDE HYDROCHLORIDE 10 MG/1
1 TABLET BEFORE MEALS TABLET ORAL THREE TIMES A DAY
Qty: 45 TABLET | Refills: 1 | COMMUNITY
Start: 2022-06-22

## 2025-06-18 NOTE — HPI-TODAY'S VISIT:
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 Posey, presents today for c/o phegm production for the past few months. Patient reports phelgm in the morning when she wakes up. She was dealing w/ sinus infection, was taking abx for it, phelgm decreased durning tx but has now resumed. She is already taking pantoprazole 40mg daily which does help w heartburns. She also reports of tightness in the epigastric region, worse w movements like walking and exercising. No relation to meals.  She just completed abd ultrasound w/ PCP, was told its normal. Patient also reports dealing w/ anal irritation, not sure if it started after abx use, not sure what she should take for her hemorrhoids. EGD '24: LA grade A reflux esophagitis, acute gastritis, bx neg h pylori or GIM. COL '24 hemorrhoids but normal, 10yr recall.

## 2025-06-18 NOTE — PHYSICAL EXAM CONSTITUTIONAL:
NAD, alert and oriented, well developed, well nourished, ambulating without difficulty, sitting comfortably in the chair no